# Patient Record
Sex: MALE | Race: WHITE | NOT HISPANIC OR LATINO | URBAN - NONMETROPOLITAN AREA
[De-identification: names, ages, dates, MRNs, and addresses within clinical notes are randomized per-mention and may not be internally consistent; named-entity substitution may affect disease eponyms.]

---

## 2021-01-15 ENCOUNTER — IMPORTED ENCOUNTER (OUTPATIENT)
Dept: URBAN - NONMETROPOLITAN AREA CLINIC 1 | Facility: CLINIC | Age: 86
End: 2021-01-15

## 2021-01-15 PROBLEM — H10.023: Noted: 2021-01-15

## 2021-01-15 PROCEDURE — 92012 INTRM OPH EXAM EST PATIENT: CPT

## 2021-01-15 NOTE — PATIENT DISCUSSION
*BACTERIAL CONJUNCTIVITIS:.-	  Discussed the acute nature and treatment options with the patient.-	  The patient was warned of the contagious nature of this infection and the use of strict hygiene to prevent spread of this condition. -	  The patient was warned to return to the office immediately if they experience loss of vision or increased pain. -	  The use of artificial tears and cool compresses were discussed with patient.-	  The patient was prescribed and instructed on the use of prescription drops. start zymaxid qid oustart pf qid ou

## 2022-04-10 ASSESSMENT — VISUAL ACUITY
OS_CC: 20/40
OD_CC: 20/40